# Patient Record
Sex: MALE | Race: WHITE | Employment: FULL TIME | URBAN - METROPOLITAN AREA
[De-identification: names, ages, dates, MRNs, and addresses within clinical notes are randomized per-mention and may not be internally consistent; named-entity substitution may affect disease eponyms.]

---

## 2017-01-01 ENCOUNTER — HOSPITAL ENCOUNTER (OUTPATIENT)
Dept: ONCOLOGY | Age: 52
Discharge: HOME | End: 2017-01-04
Attending: ORTHOPAEDIC SURGERY | Admitting: ORTHOPAEDIC SURGERY

## 2017-01-03 ENCOUNTER — HOSPITAL ENCOUNTER (OUTPATIENT)
Dept: ONCOLOGY | Age: 52
Discharge: HOME | End: 2017-01-03
Admitting: ORTHOPAEDIC SURGERY

## 2017-01-03 ENCOUNTER — HOSPITAL ENCOUNTER (OUTPATIENT)
Dept: ONCOLOGY | Age: 52
Discharge: HOME | End: 2016-12-31
Admitting: ORTHOPAEDIC SURGERY

## 2017-01-03 VITALS — SYSTOLIC BLOOD PRESSURE: 111 MMHG | TEMPERATURE: 98 F | DIASTOLIC BLOOD PRESSURE: 79 MMHG | HEART RATE: 73 BPM

## 2017-01-03 LAB
ALBUMIN SERPL-MCNC: 4.2 G/DL (ref 3.4–5)
ANION GAP SERPL CALCULATED.3IONS-SCNC: 12 MMOL/L (ref 3–16)
BUN BLDV-MCNC: 11 MG/DL (ref 7–20)
CALCIUM SERPL-MCNC: 8.6 MG/DL (ref 8.3–10.6)
CHLORIDE BLD-SCNC: 105 MMOL/L (ref 99–110)
CO2: 24 MMOL/L (ref 21–32)
CREAT SERPL-MCNC: 1 MG/DL (ref 0.9–1.3)
GFR AFRICAN AMERICAN: >60
GFR NON-AFRICAN AMERICAN: >60
GLUCOSE BLD-MCNC: 120 MG/DL (ref 70–99)
PHOSPHORUS: 2.5 MG/DL (ref 2.5–4.9)
POTASSIUM SERPL-SCNC: 4.4 MMOL/L (ref 3.5–5.1)
SODIUM BLD-SCNC: 141 MMOL/L (ref 136–145)

## 2017-01-03 RX ORDER — SODIUM CHLORIDE 9 MG/ML
INJECTION, SOLUTION INTRAVENOUS
Status: DISPENSED
Start: 2017-01-03 | End: 2017-01-03

## 2017-01-03 RX ORDER — ZOLEDRONIC ACID 5 MG/100ML
5 INJECTION, SOLUTION INTRAVENOUS ONCE
Status: COMPLETED | OUTPATIENT
Start: 2017-01-03 | End: 2017-01-03

## 2017-01-03 RX ORDER — SODIUM CHLORIDE 0.9 % (FLUSH) 0.9 %
SYRINGE (ML) INJECTION
Status: DISPENSED
Start: 2017-01-03 | End: 2017-01-03

## 2017-01-03 RX ADMIN — ZOLEDRONIC ACID 5 MG: 5 INJECTION, SOLUTION INTRAVENOUS at 13:40

## 2017-06-30 ENCOUNTER — OFFICE VISIT (OUTPATIENT)
Dept: ORTHOPEDIC SURGERY | Age: 52
End: 2017-06-30

## 2017-06-30 VITALS
WEIGHT: 180.6 LBS | HEART RATE: 77 BPM | BODY MASS INDEX: 25.28 KG/M2 | HEIGHT: 71 IN | DIASTOLIC BLOOD PRESSURE: 72 MMHG | SYSTOLIC BLOOD PRESSURE: 112 MMHG

## 2017-06-30 DIAGNOSIS — M87.051 AVASCULAR NECROSIS OF HIP, RIGHT (HCC): Primary | ICD-10-CM

## 2017-06-30 PROCEDURE — 73502 X-RAY EXAM HIP UNI 2-3 VIEWS: CPT | Performed by: ORTHOPAEDIC SURGERY

## 2017-06-30 PROCEDURE — 99214 OFFICE O/P EST MOD 30 MIN: CPT | Performed by: ORTHOPAEDIC SURGERY

## 2017-07-23 PROBLEM — M87.059 AVASCULAR NECROSIS OF HIP (HCC): Status: ACTIVE | Noted: 2017-07-23

## 2017-12-08 ENCOUNTER — OFFICE VISIT (OUTPATIENT)
Dept: ORTHOPEDIC SURGERY | Age: 52
End: 2017-12-08

## 2017-12-08 VITALS
WEIGHT: 175 LBS | HEIGHT: 71 IN | HEART RATE: 79 BPM | DIASTOLIC BLOOD PRESSURE: 71 MMHG | SYSTOLIC BLOOD PRESSURE: 126 MMHG | BODY MASS INDEX: 24.5 KG/M2

## 2017-12-08 DIAGNOSIS — M87.051 AVASCULAR NECROSIS OF HIP, RIGHT (HCC): Primary | ICD-10-CM

## 2017-12-08 PROCEDURE — 99212 OFFICE O/P EST SF 10 MIN: CPT | Performed by: ORTHOPAEDIC SURGERY

## 2017-12-08 NOTE — LETTER
[facility] [address] [address]  Phone: [phone]  Fax: [phone]    Hay Rodas MD        December 11, 2017    [facility] [address] [address]      Dear Davis De:    ***    If you have any questions or concerns, please don't hesitate to call.     Sincerely,        Hay Rodas MD

## 2017-12-17 RX ORDER — ZOLEDRONIC ACID 5 MG/100ML
5 INJECTION, SOLUTION INTRAVENOUS ONCE
Qty: 100 ML | Refills: 0
Start: 2017-12-17 | End: 2017-12-17

## 2017-12-18 NOTE — PROGRESS NOTES
Chung Jj  [id number]  Encounter Date: 12/8/2017  YOB: 1965    Chief Complaint   Patient presents with    Hip Pain     fu for RT hip AVN. doing better since Jan.        History:Mr. Chung Jj is here in follow up regarding his right hip avascular necrosis. He still has some mild pain in the hip especially with increased walking or flexion activities. He did not try to return to running but has been able to perform lower impact exercises without increasing his pain. No night pain or rest pain currently. No numbness or tingling that radiates down the leg. He is due for his reclast infusion. Exam:  /71   Pulse 79   Ht 5' 11\" (1.803 m)   Wt 175 lb (79.4 kg)   BMI 24.41 kg/m²   General: Alert and oriented x3, No acute distress, Cooperative and conversant, healthy-appearing male. Mood and affect are normal for his age and activity level.  right hip: Muscle bulk and tone show no gross atrophy. Limb lengths no significant inequality. He tolerates hip flexion to 95° and internal rotation is uncomfortable at 5° shy of neutral, he tolerates external rotation to 35°. No tenderness over the trochanteric region. Stinchfield examination shows minimal discomfort in the right hip compared to left. Straight leg raise negative for radicular signs. Gross motor function and light touch sensation intact throughout his right lower extremity. Gait: Ambulates throughout the office today with normal tandem gait no significant limp. Calf is soft with negative Homans sign. Neurologic: Good balance and coordination with no focal strength or sensory deficits noted throughout the extremity. No signs / symptoms of DVT / PE or infection    Assessment:  1. Avascular necrosis of hip, right (HCC)  zoledronic acid (RECLAST) 5 MG/100ML SOLN       Plan: At this point he would prefer to be set up for his IV weeks last infusion in December. We'll go ahead and make arrangements for this.   I would recommend that he return in another 6 months for repeat x-ray surveillance of his right hip. Otherwise, if he develops any worsening pain to the point where his activities are significantly limited he also needs to return for follow-up assessment. He knows that if he goes on to develop collapsed avascular necrosis, his only surgical option would be total hip arthroplasty. We've reviewed other strategies such as core decompression and at this point he does not feel comfortable with proceeding with any further operative interventions. He understands and accepts this course of care.

## 2017-12-20 ENCOUNTER — HOSPITAL ENCOUNTER (OUTPATIENT)
Dept: ONCOLOGY | Age: 52
Discharge: HOME | End: 2017-12-31
Admitting: ORTHOPAEDIC SURGERY

## 2017-12-20 VITALS — HEART RATE: 65 BPM | DIASTOLIC BLOOD PRESSURE: 85 MMHG | TEMPERATURE: 98.4 F | SYSTOLIC BLOOD PRESSURE: 126 MMHG

## 2017-12-20 RX ORDER — ZOLEDRONIC ACID 5 MG/100ML
5 INJECTION, SOLUTION INTRAVENOUS ONCE
Status: COMPLETED | OUTPATIENT
Start: 2017-12-20 | End: 2017-12-20

## 2017-12-20 RX ORDER — SODIUM CHLORIDE 0.9 % (FLUSH) 0.9 %
SYRINGE (ML) INJECTION
Status: DISPENSED
Start: 2017-12-20 | End: 2017-12-20

## 2017-12-20 RX ORDER — SODIUM CHLORIDE 9 MG/ML
INJECTION, SOLUTION INTRAVENOUS
Status: DISPENSED
Start: 2017-12-20 | End: 2017-12-20

## 2017-12-20 RX ADMIN — ZOLEDRONIC ACID 5 MG: 5 INJECTION, SOLUTION INTRAVENOUS at 09:08

## 2018-01-01 ENCOUNTER — HOSPITAL ENCOUNTER (OUTPATIENT)
Dept: ONCOLOGY | Age: 53
Discharge: HOME | End: 2018-01-31
Attending: ORTHOPAEDIC SURGERY | Admitting: ORTHOPAEDIC SURGERY